# Patient Record
Sex: FEMALE | Race: WHITE | NOT HISPANIC OR LATINO | Employment: OTHER | URBAN - NONMETROPOLITAN AREA
[De-identification: names, ages, dates, MRNs, and addresses within clinical notes are randomized per-mention and may not be internally consistent; named-entity substitution may affect disease eponyms.]

---

## 2018-11-15 ENCOUNTER — HOSPITAL ENCOUNTER (EMERGENCY)
Facility: HOSPITAL | Age: 23
Discharge: HOME | End: 2018-11-15
Attending: EMERGENCY MEDICINE | Admitting: EMERGENCY MEDICINE

## 2018-11-15 VITALS
WEIGHT: 133 LBS | DIASTOLIC BLOOD PRESSURE: 53 MMHG | BODY MASS INDEX: 24.48 KG/M2 | TEMPERATURE: 97.9 F | HEART RATE: 82 BPM | SYSTOLIC BLOOD PRESSURE: 106 MMHG | HEIGHT: 62 IN | OXYGEN SATURATION: 100 % | RESPIRATION RATE: 18 BRPM

## 2018-11-15 DIAGNOSIS — F33.1 MODERATE EPISODE OF RECURRENT MAJOR DEPRESSIVE DISORDER (HCC): Primary | ICD-10-CM

## 2018-11-15 PROCEDURE — 99284 EMERGENCY DEPT VISIT MOD MDM: CPT

## 2018-11-15 NOTE — NURSING NOTE
Patient states that she is not really suicidal and that [facility] just [facility] [employer] [facility] But is willing to allow intake assessment. Pt asked about what she reported to triage and she reports that she said that because the people from [facility] was there with her. She denies any active SI and says that she didn't realize it was going to be all this and just wants to go on to another rehab.

## 2018-11-15 NOTE — NURSING NOTE
Attempted to help pt take belongings up front to meet staff of [facility]. Pt hesitant and  states you dont have to do that I can take care of myself. Walked pt to waiting area to meet staff from [facility].  Patient states you dont need to be telling them my business and  states that she has decided that she is going to call family and have them pick her up from the waiting area up front and will find her own rehab. Pt states that she talked to a couple people there and was told how easy it is in the trillum and to just lie and that is what she did. Explained to pt that lying only creates problems and does not help her and that she should think about it be before doing it again and should have been honest with the other staff members. Spoke to staff. Instructed that she does not have to return with them if she refuses then she refuses. Patient dcd.

## 2018-11-15 NOTE — ED NOTES
Called lab yan[facility] to come help stick pt for blood after two unsuccessful attempts.      Haley Mcdonough  11/15/18 [id number]

## 2018-11-15 NOTE — NURSING NOTE
Intake assessement completed. Patient states that she was at [facility] last year for detox and then at [facility]. She reports that she went there yesterday. She reports that she was just having some anxiety and really did not want to be there no more and so today she told them was suicidal but in reality she is not. She reports that she just needed to get away from there and they told her they could bring her in here. She does not want to stay unless this is a rehab and she can smoke and is not really Suicidal. She reports that she has been using cocaine and pain pills for years and does have depression but she has not had any actual self harm thoughts in years. Pt denies withdrawals and says that she has not used since the 6th and is just feeling paranoid and very nervous. Pt requesting to be sent to a different rehab place. She reports that she just did not think that [facility] was the place for her.

## 2018-11-15 NOTE — NURSING NOTE
Called and spoke to Dr. Leigh. Instructed that if pt is not in withdrawal or voicing active SI to DC back to [facility] and provide a list of available resources.

## 2018-11-15 NOTE — ED PROVIDER NOTES
Subjective   Patient presents to ER with depression        Mental Health Problem   Presenting symptoms: depression and suicidal thoughts    Degree of incapacity (severity):  Mild  Onset quality:  Gradual  Timing:  Constant  Chronicity:  Recurrent      Review of Systems   Constitutional: Negative.    HENT: Negative.    Eyes: Negative.    Respiratory: Negative.    Cardiovascular: Negative.    Gastrointestinal: Negative.    Endocrine: Negative.    Genitourinary: Negative.    Musculoskeletal: Negative.    Allergic/Immunologic: Negative.    Neurological: Negative.    Hematological: Negative.    Psychiatric/Behavioral: Positive for suicidal ideas.       Past Medical History:   Diagnosis Date   • Substance abuse (CMS/[facility])    • Suicide attempt (CMS/[facility])        No Known Allergies    Past Surgical History:   Procedure Laterality Date   • UMBILICAL HERNIA REPAIR         No family history on file.    Social History     Socioeconomic History   • Marital status: Single     Spouse name: Not on file   • Number of children: Not on file   • Years of education: Not on file   • Highest education level: Not on file   Tobacco Use   • Smoking status: Current Every Day Smoker     Packs/day: 2.00     Types: Cigarettes   • Smokeless tobacco: Current User   Substance and Sexual Activity   • Alcohol use: No     Frequency: Never   • Drug use: Yes     Comment: coke marijuana an opiated.    • Sexual activity: No           Objective   Physical Exam   Constitutional: She is oriented to person, place, and time. She appears well-developed and well-nourished.   HENT:   Head: Normocephalic and atraumatic.   Eyes: EOM are normal. Pupils are equal, round, and reactive to light.   Neck: Normal range of motion.   Cardiovascular: Normal rate and regular rhythm.   Pulmonary/Chest: Effort normal.   Musculoskeletal: Normal range of motion.   Neurological: She is alert and oriented to person, place, and time.   Skin: Skin is warm.   Psychiatric:   depressed  mood, no suicidal ideation at this time   Nursing note and vitals reviewed.      Procedures           ED Course                  MDM      Final diagnoses:   Moderate episode of recurrent major depressive disorder (CMS/HCC)            Juan Castro MD  11/15/18 1004